# Patient Record
Sex: MALE | URBAN - METROPOLITAN AREA
[De-identification: names, ages, dates, MRNs, and addresses within clinical notes are randomized per-mention and may not be internally consistent; named-entity substitution may affect disease eponyms.]

---

## 2021-04-15 ENCOUNTER — OFFICE VISIT (OUTPATIENT)
Dept: URBAN - METROPOLITAN AREA CLINIC 72 | Facility: CLINIC | Age: 76
End: 2021-04-15
Payer: MEDICARE

## 2021-04-15 DIAGNOSIS — H52.13 MYOPIA, BILATERAL: Primary | ICD-10-CM

## 2021-04-15 DIAGNOSIS — H31.093 OTHER CHORIORETINAL SCARS, BILATERAL: ICD-10-CM

## 2021-04-15 DIAGNOSIS — H43.812 VITREOUS DEGENERATION, LEFT EYE: ICD-10-CM

## 2021-04-15 DIAGNOSIS — H25.813 COMBINED FORMS OF AGE-RELATED CATARACT, BILATERAL: ICD-10-CM

## 2021-04-15 PROCEDURE — 99203 OFFICE O/P NEW LOW 30 MIN: CPT | Performed by: OPTOMETRIST

## 2021-04-15 PROCEDURE — 92134 CPTRZ OPH DX IMG PST SGM RTA: CPT | Performed by: OPTOMETRIST

## 2021-04-15 ASSESSMENT — INTRAOCULAR PRESSURE
OD: 17
OS: 16

## 2021-04-15 ASSESSMENT — VISUAL ACUITY
OS: 20/20
OD: 20/25

## 2021-04-15 NOTE — IMPRESSION/PLAN
Impression: Myopia, bilateral: H52.13. Plan: New spec Rx given - Discussed changes and possible adaptation period.

## 2021-04-15 NOTE — IMPRESSION/PLAN
Impression: Other chorioretinal scars, bilateral: H31.093. Plan: Discussed diagnosis in detail with patient. No treatment is required at this time. Reassured patient of current condition and treatment. Will continue to observe condition and or symptoms.

## 2021-04-15 NOTE — IMPRESSION/PLAN
Impression: Vitreous degeneration, left eye: H43.812. Plan: Discussed Dx of posterior vitreous detachment. Discussed signs and symptoms of retinal tear/detachment. Pt to RTC PRN with any change to visual status.